# Patient Record
(demographics unavailable — no encounter records)

---

## 2025-01-24 NOTE — HISTORY OF PRESENT ILLNESS
[FreeTextEntry1] : Interval HX: Did not change dose of LT4  Quality: T2DM Severity: controlled Duration: +5years  Onset: Elevated glucose levels at work Modifying Factors: oral meds and GLP-1 Family history includes mother with DM. She has h/o PCOS, major depression, lucia eating,  SMBG 4x's a week at work  80-90    HgbA1C: 5.3%  Current Regimen: metformin 850 mg QD  ozempic 2 mg mg weekly   Eye Exam: over due Foot Exam: denies neuropathy Kidney Disease: none Heart Disease: none  Weight: Lost 110 pounds.- from ozempic  Lowest was 210 pounds. Started at 325 pounds  Not exercising right now  Diet: does see decrease in appetite  Exercise: none Smoking:  none   Hypothyroid  s/p total thyroidectomy, benign nodule, on replacement LT4 200 mcg 6 days a week and 1/2 tablet on Sunday (Lowered 12/18/2024, but she did not lower)  Currently taking LT4 200 mcg QD TSH 0.16, FT4 2.1   HLD -Started on Atorvastain 20 mg QD by PCP   Vitamin D Deficiency  Vit D 22 takes PNV

## 2025-01-24 NOTE — ASSESSMENT
[Diabetes Foot Care] : diabetes foot care [Long Term Vascular Complications] : long term vascular complications of diabetes [Carbohydrate Consistent Diet] : carbohydrate consistent diet [Importance of Diet and Exercise] : importance of diet and exercise to improve glycemic control, achieve weight loss and improve cardiovascular health [Exercise/Effect on Glucose] : exercise/effect on glucose [Retinopathy Screening] : Patient was referred to ophthalmology for retinopathy screening [Levothyroxine] : The patient was instructed to take Levothyroxine on an empty stomach, separate from vitamins, and wait at least 30 minutes before eating [FreeTextEntry1] : T2DM -Reviewed risk/complication of uncontrolled DM  -Increase exercise as tolerated 5 days a week x 30 mins/day -Increase dietary efforts, low carb/low sugar -Start to check FS once a day and keep log, bring log to next appt -Continue  mg QD and Ozempic 2 mg weekly -Annual eye exam needed   Hypothyroidism :  Discussed with patient how to take thyroid medication appropriately,empty stomach , all Multi vitamins 4 to 6 hrs away form thyroid medication, he voiced understanding. Decrease LT4 to 175 mcg QD, repeat TFTs in 6 weeks   Vit D Deficiency -Start Vit D 52868 units once a week  RTO in 6 weeks NP and 4-5 months MD

## 2025-01-24 NOTE — REASON FOR VISIT
[DM Type 2] : DM Type 2 [Hypothyroidism] : hypothyroidism [Thyroid nodule/ MNG] : thyroid nodule/ MNG [Weight Management/Obesity] : weight management/obesity